# Patient Record
Sex: MALE | Race: WHITE
[De-identification: names, ages, dates, MRNs, and addresses within clinical notes are randomized per-mention and may not be internally consistent; named-entity substitution may affect disease eponyms.]

---

## 2017-02-28 NOTE — OR
DATE OF PROCEDURE:  02/27/2017

 

PREOPERATIVE DIAGNOSES:

1. History of ulcerative colitis.

2. History of colon polyps.

 

POSTOPERATIVE DIAGNOSES:

1. Two small colon polyps, 15 and 20 cm from the anal verge.

2. History of ulcerative colitis.

3. History of colon polyps.

 

PROCEDURE:  Colonoscopy to the cecum with biopsy resection of two small colon 
polyps, 

50 and 20 cm from the anal verge.

 

ANESTHESIA:  IV anesthesia with monitored anesthesia care.

 

INDICATION:  This 61-year-old white male is referred for a colonoscopy because 
of the

history of ulcer colitis and history of polyps.  His last colonoscopic exam was 
done 5 years

ago.  At that time, no polyps were seen.  I counseled him for a colonoscopy 
with possible 

biopsy and/or polypectomy including risks and alternatives, and he gave his 
informed consent 

to proceed.

 

DESCRIPTION OF PROCEDURE:  The patient was placed in the left lateral decubitus 
position.

IV anesthesia was administered by the Anesthesia Service.  Time-out was held.  
A rectal 

exam was performed, which was unremarkable.  The flexible video Olympus 
colonoscope was

introduced through his anus, up his rectum, and out his colon all way to the 
cecum.  Once

the cecum was reached, the scope was slowly withdrawn examining the mucosa 
throughout.  

No mucosal abnormalities were noted till we reached 50 cm from the anal verge.  
Here a small

polyp was seen, which was removed with the biopsy forceps.  The scope was 
withdrawn further

with another small polyp seen 20 cm from the anal verge which was again removed 
with the

biopsy forceps.  The scope was brought back into the rectum, where it was 
retroflexed.  The

distal rectum appeared unremarkable.  The scope was straightened and removed.  
He tolerated

the procedure well.

 

 

 

 

Naveed Calhoun MD

DD:  02/27/2017 09:18:45

DT:  02/27/2017 17:13:06

Job #:  [id number]

MTDD

## 2020-09-20 ENCOUNTER — HOSPITAL ENCOUNTER (EMERGENCY)
Dept: HOSPITAL 11 - JP.ED | Age: 65
Discharge: HOME | End: 2020-09-20
Payer: COMMERCIAL

## 2020-09-20 VITALS — DIASTOLIC BLOOD PRESSURE: 106 MMHG | SYSTOLIC BLOOD PRESSURE: 160 MMHG | HEART RATE: 82 BPM

## 2020-09-20 DIAGNOSIS — Z79.84: ICD-10-CM

## 2020-09-20 DIAGNOSIS — Z20.828: ICD-10-CM

## 2020-09-20 DIAGNOSIS — E78.00: ICD-10-CM

## 2020-09-20 DIAGNOSIS — R50.9: ICD-10-CM

## 2020-09-20 DIAGNOSIS — E11.40: ICD-10-CM

## 2020-09-20 DIAGNOSIS — Z88.8: ICD-10-CM

## 2020-09-20 DIAGNOSIS — E11.21: ICD-10-CM

## 2020-09-20 DIAGNOSIS — Z79.899: ICD-10-CM

## 2020-09-20 DIAGNOSIS — I10: ICD-10-CM

## 2020-09-20 DIAGNOSIS — E66.9: ICD-10-CM

## 2020-09-20 DIAGNOSIS — E86.0: Primary | ICD-10-CM

## 2020-09-20 PROCEDURE — 87081 CULTURE SCREEN ONLY: CPT

## 2020-09-20 PROCEDURE — 85025 COMPLETE CBC W/AUTO DIFF WBC: CPT

## 2020-09-20 PROCEDURE — 96360 HYDRATION IV INFUSION INIT: CPT

## 2020-09-20 PROCEDURE — 36415 COLL VENOUS BLD VENIPUNCTURE: CPT

## 2020-09-20 PROCEDURE — 71045 X-RAY EXAM CHEST 1 VIEW: CPT

## 2020-09-20 PROCEDURE — 87804 INFLUENZA ASSAY W/OPTIC: CPT

## 2020-09-20 PROCEDURE — 87635 SARS-COV-2 COVID-19 AMP PRB: CPT

## 2020-09-20 PROCEDURE — U0002 COVID-19 LAB TEST NON-CDC: HCPCS

## 2020-09-20 PROCEDURE — 80053 COMPREHEN METABOLIC PANEL: CPT

## 2020-09-20 PROCEDURE — 87880 STREP A ASSAY W/OPTIC: CPT

## 2020-09-20 PROCEDURE — 99284 EMERGENCY DEPT VISIT MOD MDM: CPT

## 2020-09-20 PROCEDURE — 81001 URINALYSIS AUTO W/SCOPE: CPT

## 2020-09-20 NOTE — EDM.PDOC
ED HPI GENERAL MEDICAL PROBLEM





- General


Chief Complaint: General


Stated Complaint: SUGAR LEVLES


Time Seen by Provider: 09/20/20 14:49


Source of Information: Reports: Patient


History Limitations: Reports: No Limitations





- History of Present Illness


INITIAL COMMENTS - FREE TEXT/NARRATIVE: 





pt has had a dry hacky cough for the past week. Today he felt very weak and was 

not able to do his work. He ate some sugar because he thought it could be a low 

sugar. Pt did not vomit. He has had chills all day and a headache. He was not 

able to eat this am. 


Onset: Today, Gradual, Other ( this has been coming and going all day. )


Duration: Hour(s):


Location: Reports: Generalized, Other ( alot of muscle weakness and pain. )


Associated Symptoms: Reports: Loss of Appetite, Malaise, Weakness





- Related Data


                                    Allergies











Allergy/AdvReac Type Severity Reaction Status Date / Time


 


ACE Inhibitors Allergy  Cough Verified 09/20/20 14:07


 


meperidine Allergy  Shaking Verified 09/20/20 14:07











Home Meds: 


                                    Home Meds





Metoprolol Succinate [Toprol XL] 50 mg PO DAILY 02/24/17 [History]


metFORMIN [Glucophage] 500 mg PO BID 02/24/17 [History]


Simvastatin 20 mg PO DAILY 01/30/19 [History]











Past Medical History


HEENT History: Reports: Impaired Vision


Cardiovascular History: Reports: High Cholesterol, Hypertension


Gastrointestinal History: Reports: Colon Polyp, Other (See Below)


Other Gastrointestinal History: colitis


Genitourinary History: Reports: Diabetic Nephropathy, Prostate Disorder


Musculoskeletal History: Reports: Arthritis


Neurological History: Reports: Neuropathy, Diabetic


Endocrine/Metabolic History: Reports: Diabetes, Type II, Obesity/BMI 30+


Immunologic History: Reports: Other (See Below)


Other Immunologic History: [place], [place]





- Infectious Disease History


Infectious Disease History: Reports: Chicken Pox, Measles





- Past Surgical History


Head Surgeries/Procedures: Reports: None


HEENT Surgical History: Reports: None


Cardiovascular Surgical History: Reports: None


GI Surgical History: Reports: Appendectomy, Colonoscopy


Endocrine Surgical History: Reports: None


Neurological Surgical History: Reports: None


Musculoskeletal Surgical History: Reports: Knee Replacement, Shoulder Surgery


Dermatological Surgical History: Reports: None





Social & Family History





- Tobacco Use


Smoking Status *Q: Never Smoker


Second Hand Smoke Exposure: No





- Caffeine Use


Caffeine Use: Reports: Coffee





- Recreational Drug Use


Recreational Drug Use: No





ED ROS GENERAL





- Review of Systems


Review Of Systems: See Below


Constitutional: Reports: Chills, Malaise, Weakness, Decreased Appetite


HEENT: Reports: No Symptoms


Respiratory: Reports: Other ( hack dry cough)


Cardiovascular: Reports: No Symptoms


Endocrine: Reports: No Symptoms


GI/Abdominal: Reports: No Symptoms


: Reports: No Symptoms


Musculoskeletal: Reports: No Symptoms


Skin: Reports: No Symptoms





ED EXAM, GENERAL





- Physical Exam


Exam: See Below


Free Text/Narrative:: 





pt appears lethargic and very fatiqued, He just became ill this am. His wife 

appears to have the same thing. 


Exam Limited By: No Limitations


General Appearance: Alert, Anxious, Mild Distress, Other (pupils are equal and 

reactive. )


Ears: Normal TMs


Nose: Normal Inspection


Throat/Mouth: Normal Inspection


Head: Atraumatic


Neck: Normal Inspection


Respiratory/Chest: No Respiratory Distress


Cardiovascular: Regular Rate, Rhythm, Tachycardia


GI/Abdominal: Soft, Non-Tender


 (Male) Exam: Deferred


Rectal (Males) Exam: Deferred


Back Exam: Normal Inspection


Extremities: Normal Inspection


Neurological: Alert, Oriented, Normal Cognition





Course





- Vital Signs


Last Recorded V/S: 


                                Last Vital Signs











Temp  37.1 C   09/20/20 14:11


 


Pulse  82   09/20/20 14:11


 


Resp  16   09/20/20 14:11


 


BP  160/106 H  09/20/20 14:11


 


Pulse Ox  98   09/20/20 14:11














- Orders/Labs/Meds


Labs: 


                                Laboratory Tests











  09/20/20 09/20/20 09/20/20 Range/Units





  14:23 14:23 14:45 


 


WBC  33.9 H*    (4.5-11.0)  K/uL


 


RBC  5.65    (4.30-5.90)  M/uL


 


Hgb  16.5 H    (12.0-15.0)  g/dL


 


Hct  49.3    (40.0-54.0)  %


 


MCV  87    (80-98)  fL


 


MCH  29    (27-31)  pg


 


MCHC  34    (32-36)  %


 


Plt Count  231    (150-400)  K/uL


 


Add Manual Diff  Yes    


 


Neutrophils % (Manual)  69 H    (36-66)  %


 


Band Neutrophils %  16 H    (5-11)  %


 


Lymphocytes % (Manual)  10 L    (24-44)  %


 


Monocytes % (Manual)  5    (2-6)  %


 


Sodium   141   (140-148)  mmol/L


 


Potassium   3.9   (3.6-5.2)  mmol/L


 


Chloride   105   (100-108)  mmol/L


 


Carbon Dioxide   24   (21-32)  mmol/L


 


Anion Gap   11.6   (5.0-14.0)  mmol/L


 


BUN   20 H   (7-18)  mg/dL


 


Creatinine   1.1   (0.8-1.3)  mg/dL


 


Est Cr Clr Drug Dosing   66.50   mL/min


 


Estimated GFR (MDRD)   > 60   (>60)  


 


Glucose   130 H   ()  mg/dL


 


Calcium   9.0   (8.5-10.1)  mg/dL


 


Total Bilirubin   1.0   (0.2-1.0)  mg/dL


 


AST   11 L   (15-37)  U/L


 


ALT   23   (12-78)  U/L


 


Alkaline Phosphatase   51   ()  U/L


 


Total Protein   8.1   (6.4-8.2)  g/dL


 


Albumin   4.0   (3.4-5.0)  g/dL


 


Globulin   4.1 H   (2.3-3.5)  g/dL


 


Albumin/Globulin Ratio   1.0 L   (1.2-2.2)  


 


Urine Color     (YELLOW)  


 


Urine Appearance     (CLEAR)  


 


Urine pH     (5.0-8.0)  


 


Ur Specific Gravity     (1.008-1.030)  


 


Urine Protein     (NEGATIVE)  mg/dL


 


Urine Glucose (UA)     (NEGATIVE)  mg/dL


 


Urine Ketones     (NEGATIVE)  mg/dL


 


Urine Occult Blood     (NEGATIVE)  


 


Urine Nitrite     (NEGATIVE)  


 


Urine Bilirubin     (NEGATIVE)  


 


Urine Urobilinogen     (0.2-1.0)  EU/dL


 


Ur Leukocyte Esterase     (NEGATIVE)  


 


Urine RBC     (0-5)  


 


Urine WBC     (0-5)  


 


Ur Epithelial Cells     


 


Urine Bacteria     


 


Urine Mucus     


 


SARS-CoV-2 RNA (SURJIT)    Negative  (NEGATIVE)  














  09/20/20 Range/Units





  16:02 


 


WBC   (4.5-11.0)  K/uL


 


RBC   (4.30-5.90)  M/uL


 


Hgb   (12.0-15.0)  g/dL


 


Hct   (40.0-54.0)  %


 


MCV   (80-98)  fL


 


MCH   (27-31)  pg


 


MCHC   (32-36)  %


 


Plt Count   (150-400)  K/uL


 


Add Manual Diff   


 


Neutrophils % (Manual)   (36-66)  %


 


Band Neutrophils %   (5-11)  %


 


Lymphocytes % (Manual)   (24-44)  %


 


Monocytes % (Manual)   (2-6)  %


 


Sodium   (140-148)  mmol/L


 


Potassium   (3.6-5.2)  mmol/L


 


Chloride   (100-108)  mmol/L


 


Carbon Dioxide   (21-32)  mmol/L


 


Anion Gap   (5.0-14.0)  mmol/L


 


BUN   (7-18)  mg/dL


 


Creatinine   (0.8-1.3)  mg/dL


 


Est Cr Clr Drug Dosing   mL/min


 


Estimated GFR (MDRD)   (>60)  


 


Glucose   ()  mg/dL


 


Calcium   (8.5-10.1)  mg/dL


 


Total Bilirubin   (0.2-1.0)  mg/dL


 


AST   (15-37)  U/L


 


ALT   (12-78)  U/L


 


Alkaline Phosphatase   ()  U/L


 


Total Protein   (6.4-8.2)  g/dL


 


Albumin   (3.4-5.0)  g/dL


 


Globulin   (2.3-3.5)  g/dL


 


Albumin/Globulin Ratio   (1.2-2.2)  


 


Urine Color  Yellow  (YELLOW)  


 


Urine Appearance  Clear  (CLEAR)  


 


Urine pH  5.5  (5.0-8.0)  


 


Ur Specific Gravity  >= 1.030  (1.008-1.030)  


 


Urine Protein  Negative  (NEGATIVE)  mg/dL


 


Urine Glucose (UA)  Negative  (NEGATIVE)  mg/dL


 


Urine Ketones  Negative  (NEGATIVE)  mg/dL


 


Urine Occult Blood  Negative  (NEGATIVE)  


 


Urine Nitrite  Negative  (NEGATIVE)  


 


Urine Bilirubin  Negative  (NEGATIVE)  


 


Urine Urobilinogen  0.2  (0.2-1.0)  EU/dL


 


Ur Leukocyte Esterase  Negative  (NEGATIVE)  


 


Urine RBC  Not seen  (0-5)  


 


Urine WBC  Not seen  (0-5)  


 


Ur Epithelial Cells  Not seen  


 


Urine Bacteria  Not seen  


 


Urine Mucus  Few  


 


SARS-CoV-2 RNA (SURJIT)   (NEGATIVE)  











Meds: 


Medications














Discontinued Medications














Generic Name Dose Route Start Last Admin





  Trade Name Juan Fq  PRN Reason Stop Dose Admin


 


Acetaminophen  650 mg  09/20/20 15:57  09/20/20 16:03





  Tylenol  PO  09/20/20 15:58  650 mg





  NOW ONE   Administration


 


Sodium Chloride  1,000 mls @ 999 mls/hr  09/20/20 15:00  09/20/20 15:02





  Normal Saline  IV   999 mls/hr





  ASDIRECTED NIKOLAS   Administration














- Re-Assessments/Exams


Free Text/Narrative Re-Assessment/Exam: 





09/25/20 01:54


pt was found to have a very high wbc just like his wife, both are over 30,000





Departure





- Departure


Time of Disposition: 16:24


Disposition: Home, Self-Care 01


Condition: Fair


Clinical Impression: 


 Febrile illness, Dehydration








- Discharge Information


Instructions:  Dehydration, Adult, Easy-to-Read, Fever, Adult, Easy-to-Read


Referrals: 


Michael Mattson MD [Primary Care Provider] - 


Forms:  ED Department Discharge


Care Plan Goals: 


push fluids, tylenol or motrin for fever or muscle pain, rtc tomorrow for repeat

wbc and diff. rtc if temp should become very high





Sepsis Event Note (ED)





- Evaluation


Sepsis Screening Result: No Definite Risk

## 2020-09-21 NOTE — CR
CHEST: Portable 9/20/2020 at 3:52 PM

 

CLINICAL HISTORY:Cough and leukocytosis

 

COMPARISON:2008

 

FINDINGS:  The heart is enlarged. Pulmonary vascularity is normal. There is some

elevation of the right hemidiaphragm. This is increased since prior study. There

is minimal streaky density in the left lower lobe.

 

Impression: Cardiomegaly

 

Streaky density in left lower lobe may represent small pneumonia

 

Elevation of the right hemidiaphragm increased since 2008.